# Patient Record
Sex: MALE | Race: WHITE | NOT HISPANIC OR LATINO
[De-identification: names, ages, dates, MRNs, and addresses within clinical notes are randomized per-mention and may not be internally consistent; named-entity substitution may affect disease eponyms.]

---
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
## 2023-04-26 ENCOUNTER — NON-APPOINTMENT (OUTPATIENT)
Age: 18
End: 2023-04-26

## 2025-01-18 NOTE — ED PROVIDER NOTE - OBJECTIVE STATEMENT
"  Problem: General Rehab Plan of Care  Goal: Therapeutic Recreation/Music Therapy Goal  Description  The patient and/or their representative will achieve their patient-specific goals related to the plan of care.  The patient-specific goals include:    1. Patient will identify personal risk factors and signs/symptoms related to risk for violence.  2. Patient will identify a personal plan to report feelings (loss of control) and how to seek assistance from individuals who are prepared to intervene.  3. Patient will increase expression of feelings, needs and concerns through nonviolent channels.  4. Patient will practice assertive communication skills.  5. Patient will practice relaxation techniques (music, art and recreation)  6. Patient will utilize self-calming and protection techniques.  7. Patient will have an enhanced sense of safety; decreased feelings of vulnerability.  8. Patient will use Zones of Regulation curriculum.      Attended 2 hours of music therapy group. Interventions focused on improving socialization, self-expression, and mood. Pt had a bright affect and was social throughout both groups. Was restless and did require redirection for jumping on chairs and speaking loudly. He became irritable when redirected, and stated \"make me,\" but when redirected a second time, he complied. He actively participated in music bingo and in creating a song autobiography. Appeared to calm when coloring poster with peers and listening to music independently.   8/20/2019 2033 by Mala Thomas  Outcome: No Change     " Patent 15 yo M, no reported significant PMHx, presents with mom for bilateral knee pain x 2wks. There was swelling first, then burning in the posterior knees, radiating to his calves, all of which resolved, and now there is feeling of instability in both knees.   Pt does not play sports, go to the gym, run, or work out in any other way. He states that was more active ~6 mos ago, but has been mostly staying at home lately. The past 2 wks, he has been sitting or lying down (COVID, lack of activities to partake in, cold outside). Mom expresses concern for DVT given grandfather's h/o DVT, and pt's recent prolonged immobility. Calves are nontender, symmetric, without swelling, without collateral superficial veins.  Pt also reports that his shoulders sometimes feel stiff.   No other complaints, no fever/chills, CP, SOB, abd pain, NVD, dysuria, rashes.